# Patient Record
Sex: MALE | URBAN - METROPOLITAN AREA
[De-identification: names, ages, dates, MRNs, and addresses within clinical notes are randomized per-mention and may not be internally consistent; named-entity substitution may affect disease eponyms.]

---

## 2022-01-20 ENCOUNTER — OFFICE VISIT (OUTPATIENT)
Dept: URBAN - METROPOLITAN AREA CLINIC 6 | Facility: CLINIC | Age: 57
End: 2022-01-20
Payer: COMMERCIAL

## 2022-01-20 DIAGNOSIS — H25.811 COMBINED FORMS OF AGE-RELATED CATARACT, RIGHT EYE: ICD-10-CM

## 2022-01-20 DIAGNOSIS — H17.11 CENTRAL CORNEAL OPACITY OF RIGHT EYE: ICD-10-CM

## 2022-01-20 DIAGNOSIS — E11.3551 DIABETES MELLITUS TYPE 2 WITH STABLE PROLIFERATIVE RETINOPATHY, RIGHT EYE: Primary | ICD-10-CM

## 2022-01-20 PROCEDURE — 92014 COMPRE OPH EXAM EST PT 1/>: CPT | Performed by: OPTOMETRIST

## 2022-01-20 PROCEDURE — 92134 CPTRZ OPH DX IMG PST SGM RTA: CPT | Performed by: OPTOMETRIST

## 2022-01-20 ASSESSMENT — INTRAOCULAR PRESSURE: OD: 8

## 2022-01-20 NOTE — IMPRESSION/PLAN
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 Plan: Corneal Scar RT - Large and central. Will limit VA improvement after CE. Will need PK to correct but given monoculr status and level of current VA will defer Sx for now. He is happy with RGP lens he got with Dr. Christopher Hurd.

## 2022-01-20 NOTE — IMPRESSION/PLAN
Impression: Combined forms of age-related cataract, right eye: H25.811. Plan: Observe for now without intervention. The patient was advised to contact us if any change or worsening of vision.

## 2022-01-20 NOTE — IMPRESSION/PLAN
Impression: Diabetes mellitus Type 2 with stable proliferative retinopathy, right eye: K09.1430. Plan: Diabetes Mellitus Type II with regressed Proliferative Diabetic Retinopathy right eye with absence of  macular edema on MOCT (C74.2220)- Discussed the pathophysiology of diabetes and its effect on the eye. Stressed the importance of strong glucose control. Advised of importance of scheduled dilated examinations, and to contact us immediately for any problems or concerns. CCrx with Dr. Mary Beth Cotto with next appt Feb 16th with appts every 10 weeks.